# Patient Record
Sex: FEMALE | Race: WHITE | NOT HISPANIC OR LATINO | Employment: OTHER | URBAN - METROPOLITAN AREA
[De-identification: names, ages, dates, MRNs, and addresses within clinical notes are randomized per-mention and may not be internally consistent; named-entity substitution may affect disease eponyms.]

---

## 2017-04-03 ENCOUNTER — GENERIC CONVERSION - ENCOUNTER (OUTPATIENT)
Dept: OTHER | Facility: OTHER | Age: 67
End: 2017-04-03

## 2017-06-15 ENCOUNTER — GENERIC CONVERSION - ENCOUNTER (OUTPATIENT)
Dept: OTHER | Facility: OTHER | Age: 67
End: 2017-06-15

## 2017-07-31 ENCOUNTER — ALLSCRIPTS OFFICE VISIT (OUTPATIENT)
Dept: OTHER | Facility: OTHER | Age: 67
End: 2017-07-31

## 2017-07-31 DIAGNOSIS — Z00.00 ENCOUNTER FOR GENERAL ADULT MEDICAL EXAMINATION WITHOUT ABNORMAL FINDINGS: ICD-10-CM

## 2017-07-31 DIAGNOSIS — E55.9 VITAMIN D DEFICIENCY: ICD-10-CM

## 2017-07-31 DIAGNOSIS — I10 ESSENTIAL (PRIMARY) HYPERTENSION: ICD-10-CM

## 2017-08-30 ENCOUNTER — GENERIC CONVERSION - ENCOUNTER (OUTPATIENT)
Dept: OTHER | Facility: OTHER | Age: 67
End: 2017-08-30

## 2018-01-15 VITALS
SYSTOLIC BLOOD PRESSURE: 120 MMHG | HEART RATE: 121 BPM | BODY MASS INDEX: 17.61 KG/M2 | TEMPERATURE: 97.3 F | OXYGEN SATURATION: 99 % | RESPIRATION RATE: 18 BRPM | DIASTOLIC BLOOD PRESSURE: 72 MMHG | HEIGHT: 61 IN | WEIGHT: 93.25 LBS

## 2018-01-16 NOTE — MISCELLANEOUS
Message   Recorded as Task   Date: 01/13/2016 09:37 AM, Created By: System   Task Name: Schedule Appointment   Assigned To: [facility],Team   Regarding Patient: Tristan Adame, Status: Active   Comment:    System - 13 Jan 2016 9:37 AM     Preferred Communication: Mail  Ordering Site: [address] [place] [address]    Referred To: Orman Simmonds MD, [facility]  To Be Done: 13 Jan 2016   Nuno Poole - 13 Jan 2016 9:47 AM     TASK REASSIGNED: Previously Assigned To Jesica Meadows  Please call patient to schedule an appointment within 48 hours on their  The best time to reach the patient is in the  Patient prefers appointment to be  or     Last Mammogram date:[  ]    After this appointment is scheduled please reply back to [  ] team    Maria D Holm - 13 Jan 2016 4:58 PM     TASK EDITED  Called and spoke to patient  Scheduled colono consultation for 2/23/2016 in the [facility] office  Patient aware of office location  Mailed packet to patients home address  Patient states that this will be her first colono  Active Problems    1  Abdominal pain (789 00) (R10 9)   2  Acute sinusitis (461 9) (J01 90)   3  Allergic rhinitis (477 9) (J30 9)   4  Anxiety (300 00) (F41 9)   5  Arthritis (716 90) (M19 90)   6  Chronic sinusitis (473 9) (J32 9)   7  Colon cancer screening (V76 51) (Z12 11)   8  Dry skin (701 1) (L85 3)   9  Encounter for screening mammogram for malignant neoplasm of breast (V76 12)   (Z12 31)   10  Esophagitis, reflux (530 11) (K21 0)   11  Generalized anxiety disorder (300 02) (F41 1)   12  Glaucoma (365 9) (H40 9)   13  Hypertension (401 9) (I10)   14  Preoperative clearance (V72 84) (Z01 818)   15  Rhinitis, non-allergic (472 0) (J31 0)   16  Screening for neurological condition (V80 09) (Z13 89)   17  Viral gastroenteritis (008 8) (A08 4)   18  Vitamin D deficiency (268 9) (E55 9)    Current Meds   1   ALPRAZolam 0 25 MG Oral Tablet; TAKE 1 TABLET 3 TIMES DAILY AS NEEDED; Therapy: 82TIB8553 to (Evaluate:26Sbf9945); Last Rx:28Jun2013 Ordered   2  Azelastine HCl - 0 1 % Nasal Solution; INSERT 2 SQUIRTS IN EACH NOSTRIL TWICE   DAILY; Therapy: 80EHK4607 to (Last Rx:26Nov2014)  Requested for: 23DJA2521 Ordered   3  Azelastine HCl - 0 1 % Nasal Solution; Therapy: 54WUM1686 to Recorded   4  Fluticasone Propionate 50 MCG/ACT Nasal Suspension (Flonase); USE 2 SPRAYS IN   EACH NOSTRIL ONCE DAILY; Therapy: 38MNY8096 to (Evaluate:16Apr2016)  Requested for: 70Vyw0355; Last   Rx:25Anm2864 Ordered   5  Lisinopril 20 MG Oral Tablet; TAKE 1 TABLET EVERY DAY; Therapy: 76SLG5146 to (Evaluate:13Mar2016)  Requested for: 14POY3074; Last   Rx:13Jan2016 Ordered   6  Ocean Nasal Spray 0 65 % Nasal Solution; Inhale 1-2 sprays in each nostril as needed; Therapy: 85AMC2015 to (Last Rx:14Cfe0012)  Requested for: 59USG2633 Ordered   7  Omeprazole 20 MG Oral Capsule Delayed Release; TAKE 1 CAPSULE DAILY; Therapy: 53XWK8600 to (Ligia Lerma)  Requested for: 28PPN4130; Last   Rx:06Nov2012 Ordered   8  Ranitidine HCl - 300 MG Oral Tablet (Zantac); TAKE 1 TABLET DAILY AT BEDTIME; Therapy: 28VHL7443 to (Last Shawna Philip)  Requested for: 23XJU7470 Ordered   9  Vitamin D3 2000 UNIT Oral Capsule; TAKE ONE CAPSULE T BY MOUTH ONCE A DAY; Therapy: 34SNW0983 to (Last Rx:77Msy8167)  Requested for: 25Sry5781 Ordered    Allergies    1   Bactrim TABS    Signatures   Electronically signed by : Geovany Bear, ; Jan 13 2016  4:58PM EST                       (Author)

## 2018-01-18 NOTE — MISCELLANEOUS
Dear, Colleen Rob  The Physicians and staff of [address] [place] [address]  wish to remind and invite you to take full advantage of the new preventative service offered by Medicare AT NO COST TO YOU! The name of the service is the [address]    This new service is NOT to replace  any routine of scheduled visits for acute illness, chronic disease management or office check-ups  **The Annual Wellness Visit is NOT a physical**  These new free to you annual visits are designed to address any  PREVENTATIVE SERVICES that you may require and DESERVE as well as identify any other specific risk factors that may exist relating to your overall health and well-being  Additionally, you will receive a [address] [place] [address] Road to address any needed  preventative services or health services developed around your own specific needs  At the time of this visit you may receive referrals for any needed services in regards to specific problems, social/medical concerns or health risk factors  You are entitled  to the free Annual Wellness Visit every 12 months with no out of pocket cost to you for the services performed in our office  If you participate with Medicare Advantage Plan (Medicare Blue, [employer] Medicare, [employer] or other [employer]) you ARE eligible for this service    CALL US TODAY AND SCHEDULE AN Edil Emmanuel!  [phone]  Sincerely,

## 2018-04-05 DIAGNOSIS — J31.0 CHRONIC RHINITIS, UNSPECIFIED TYPE: Primary | ICD-10-CM

## 2018-04-06 RX ORDER — FLUTICASONE PROPIONATE 50 MCG
SPRAY, SUSPENSION (ML) NASAL
Qty: 48 G | Refills: 1 | Status: SHIPPED | OUTPATIENT
Start: 2018-04-06 | End: 2018-09-27 | Stop reason: SDUPTHER

## 2018-04-16 DIAGNOSIS — I10 ESSENTIAL HYPERTENSION: Primary | ICD-10-CM

## 2018-04-16 RX ORDER — LISINOPRIL 20 MG/1
TABLET ORAL
Qty: 90 TABLET | Refills: 2 | Status: SHIPPED | OUTPATIENT
Start: 2018-04-16 | End: 2018-09-27 | Stop reason: SDUPTHER

## 2018-09-27 ENCOUNTER — OFFICE VISIT (OUTPATIENT)
Dept: FAMILY MEDICINE CLINIC | Facility: CLINIC | Age: 68
End: 2018-09-27
Payer: COMMERCIAL

## 2018-09-27 VITALS
WEIGHT: 99.13 LBS | SYSTOLIC BLOOD PRESSURE: 138 MMHG | OXYGEN SATURATION: 96 % | BODY MASS INDEX: 18.71 KG/M2 | HEART RATE: 110 BPM | HEIGHT: 61 IN | DIASTOLIC BLOOD PRESSURE: 72 MMHG | RESPIRATION RATE: 18 BRPM | TEMPERATURE: 97.5 F

## 2018-09-27 DIAGNOSIS — E55.9 VITAMIN D DEFICIENCY: ICD-10-CM

## 2018-09-27 DIAGNOSIS — I10 ESSENTIAL HYPERTENSION: ICD-10-CM

## 2018-09-27 DIAGNOSIS — F41.9 ANXIETY: ICD-10-CM

## 2018-09-27 DIAGNOSIS — I10 BENIGN ESSENTIAL HYPERTENSION: Primary | ICD-10-CM

## 2018-09-27 DIAGNOSIS — Z12.11 COLON CANCER SCREENING: ICD-10-CM

## 2018-09-27 DIAGNOSIS — J31.0 RHINITIS, NON-ALLERGIC: ICD-10-CM

## 2018-09-27 DIAGNOSIS — F17.200 CURRENT EVERY DAY SMOKER: ICD-10-CM

## 2018-09-27 PROBLEM — R63.4 UNINTENTIONAL WEIGHT LOSS: Status: ACTIVE | Noted: 2017-07-31

## 2018-09-27 PROCEDURE — 3078F DIAST BP <80 MM HG: CPT | Performed by: FAMILY MEDICINE

## 2018-09-27 PROCEDURE — 1160F RVW MEDS BY RX/DR IN RCRD: CPT | Performed by: FAMILY MEDICINE

## 2018-09-27 PROCEDURE — 1101F PT FALLS ASSESS-DOCD LE1/YR: CPT | Performed by: FAMILY MEDICINE

## 2018-09-27 PROCEDURE — 99215 OFFICE O/P EST HI 40 MIN: CPT | Performed by: FAMILY MEDICINE

## 2018-09-27 PROCEDURE — 3008F BODY MASS INDEX DOCD: CPT | Performed by: FAMILY MEDICINE

## 2018-09-27 PROCEDURE — 3075F SYST BP GE 130 - 139MM HG: CPT | Performed by: FAMILY MEDICINE

## 2018-09-27 PROCEDURE — 3725F SCREEN DEPRESSION PERFORMED: CPT | Performed by: FAMILY MEDICINE

## 2018-09-27 RX ORDER — AZELASTINE 1 MG/ML
1 SPRAY, METERED NASAL 2 TIMES DAILY
Qty: 30 ML | Refills: 0 | Status: SHIPPED | OUTPATIENT
Start: 2018-09-27 | End: 2018-09-29 | Stop reason: SDUPTHER

## 2018-09-27 RX ORDER — CHOLECALCIFEROL (VITAMIN D3) 125 MCG
1 TABLET ORAL DAILY
Qty: 90 TABLET | Refills: 2 | Status: SHIPPED | OUTPATIENT
Start: 2018-09-27 | End: 2019-06-30 | Stop reason: SDUPTHER

## 2018-09-27 RX ORDER — LISINOPRIL 20 MG/1
20 TABLET ORAL DAILY
Qty: 90 TABLET | Refills: 2 | Status: SHIPPED | OUTPATIENT
Start: 2018-09-27 | End: 2019-11-26 | Stop reason: SDUPTHER

## 2018-09-27 RX ORDER — FLUTICASONE PROPIONATE 50 MCG
2 SPRAY, SUSPENSION (ML) NASAL DAILY
Qty: 48 G | Refills: 2 | Status: SHIPPED | OUTPATIENT
Start: 2018-09-27 | End: 2019-06-19 | Stop reason: SDUPTHER

## 2018-09-27 NOTE — PROGRESS NOTES
Assessment/Plan:    Current every day smoker  Spent over 50% of encounter counseling on importance of quitting smoking  Counseled on strategies to handle anxiety to help her quit smoking  Suggested she reach out to quit hotline to help her quit  Referred for PFT    Vitamin D deficiency  Vitamin D level ordered     Benign essential hypertension  Controlled  Continue Lisinopril 20 mg  Kidney function BW and microalb ordered          Problem List Items Addressed This Visit        Respiratory    Rhinitis, non-allergic    Relevant Medications    fluticasone (FLONASE) 50 mcg/act nasal spray    azelastine (ASTELIN) 0 1 % nasal spray    Other Relevant Orders    CBC and differential    Comprehensive metabolic panel    Lipid panel    TSH, 3rd generation with Free T4 reflex    Microalbumin / creatinine urine ratio       Cardiovascular and Mediastinum    Benign essential hypertension - Primary     Controlled  Continue Lisinopril 20 mg  Kidney function BW and microalb ordered          Relevant Medications    lisinopril (ZESTRIL) 20 mg tablet    Other Relevant Orders    CBC and differential    Comprehensive metabolic panel    Lipid panel    TSH, 3rd generation with Free T4 reflex    Microalbumin / creatinine urine ratio    Hypertension    Relevant Medications    lisinopril (ZESTRIL) 20 mg tablet    Other Relevant Orders    CBC and differential    Comprehensive metabolic panel    Lipid panel    TSH, 3rd generation with Free T4 reflex    Microalbumin / creatinine urine ratio       Other    Anxiety    Relevant Orders    CBC and differential    Comprehensive metabolic panel    Lipid panel    TSH, 3rd generation with Free T4 reflex    Microalbumin / creatinine urine ratio    Vitamin D deficiency     Vitamin D level ordered          Relevant Medications    Ergocalciferol (VITAMIN D2) 2000 units TABS    Other Relevant Orders    CBC and differential    Comprehensive metabolic panel    Lipid panel    TSH, 3rd generation with Free T4 reflex    Microalbumin / creatinine urine ratio    Current every day smoker     Spent over 50% of encounter counseling on importance of quitting smoking  Counseled on strategies to handle anxiety to help her quit smoking  Suggested she reach out to quit hotline to help her quit  Referred for PFT         Relevant Orders    Spirometry    CBC and differential    Comprehensive metabolic panel    Lipid panel    TSH, 3rd generation with Free T4 reflex    Microalbumin / creatinine urine ratio      Other Visit Diagnoses     Colon cancer screening        Relevant Orders    Ambulatory referral to Gastroenterology    CBC and differential    Comprehensive metabolic panel    Lipid panel    TSH, 3rd generation with Free T4 reflex    Microalbumin / creatinine urine ratio            Subjective:      Patient ID: Krishna Motta is a 70 y o  female  71 yo female with HTN and allergic rhinitis here today for follow up         The following portions of the patient's history were reviewed and updated as appropriate:   She  has no past medical history on file  She   Patient Active Problem List    Diagnosis Date Noted    Current every day smoker 09/27/2018    Unintentional weight loss 07/31/2017    Glaucoma 07/30/2015    Dry skin 01/30/2015    Rhinitis, non-allergic 05/19/2014    Vitamin D deficiency 05/19/2014    Anxiety 12/19/2013    Generalized anxiety disorder 06/28/2013    Chronic sinusitis 06/26/2013    Hypertension 09/11/2012    Benign essential hypertension 02/21/2008     She  has no past surgical history on file  Her family history includes Hypertension in her sister  She  reports that she has been smoking Cigarettes  She has never used smokeless tobacco  She reports that she does not drink alcohol or use drugs    Current Outpatient Prescriptions   Medication Sig Dispense Refill    azelastine (ASTELIN) 0 1 % nasal spray 1 spray into each nostril 2 (two) times a day Use in each nostril as directed 30 mL 0    Ergocalciferol (VITAMIN D2) 2000 units TABS Take 1 tablet (2,000 Units total) by mouth daily 90 tablet 2    fluticasone (FLONASE) 50 mcg/act nasal spray 2 sprays into each nostril daily 48 g 2    lisinopril (ZESTRIL) 20 mg tablet Take 1 tablet (20 mg total) by mouth daily 90 tablet 2     No current facility-administered medications for this visit  Current Outpatient Prescriptions on File Prior to Visit   Medication Sig    [DISCONTINUED] fluticasone (FLONASE) 50 mcg/act nasal spray instill 2 sprays into each nostril once daily    [DISCONTINUED] lisinopril (ZESTRIL) 20 mg tablet take 1 tablet by mouth once daily     No current facility-administered medications on file prior to visit       Review of Systems   All other systems reviewed and are negative  Objective:      /72 (BP Location: Right arm, Patient Position: Sitting, Cuff Size: Standard)   Pulse (!) 110   Temp 97 5 °F (36 4 °C) (Tympanic)   Resp 18   Ht 5' 1" (1 549 m)   Wt 45 kg (99 lb 2 oz)   SpO2 96%   BMI 18 73 kg/m²          Physical Exam   Constitutional: She is oriented to person, place, and time  She appears well-developed  HENT:   Head: Normocephalic  Right Ear: External ear normal    Left Ear: External ear normal    Nose: Nose normal    Mouth/Throat: Oropharynx is clear and moist    Eyes: Pupils are equal, round, and reactive to light  Conjunctivae and EOM are normal    Neck: Normal range of motion  Neck supple  No thyromegaly present  Cardiovascular: Normal rate, regular rhythm and normal heart sounds  Pulmonary/Chest: Effort normal and breath sounds normal    Abdominal: Soft  There is no tenderness  There is no rebound and no guarding  Musculoskeletal: Normal range of motion  Neurological: She is alert and oriented to person, place, and time  She has normal reflexes  Skin: Skin is dry  Psychiatric: She has a normal mood and affect  Nursing note and vitals reviewed

## 2018-09-27 NOTE — ASSESSMENT & PLAN NOTE
Spent over 50% of encounter counseling on importance of quitting smoking  Counseled on strategies to handle anxiety to help her quit smoking  Suggested she reach out to quit hotline to help her quit  Referred for PFT

## 2018-09-29 DIAGNOSIS — J31.0 RHINITIS, NON-ALLERGIC: ICD-10-CM

## 2018-10-01 RX ORDER — AZELASTINE HYDROCHLORIDE 137 UG/1
SPRAY, METERED NASAL
Qty: 30 ML | Refills: 0 | Status: SHIPPED | OUTPATIENT
Start: 2018-10-01 | End: 2019-11-26 | Stop reason: SDUPTHER

## 2018-10-11 ENCOUNTER — TELEPHONE (OUTPATIENT)
Dept: GASTROENTEROLOGY | Facility: MEDICAL CENTER | Age: 68
End: 2018-10-11

## 2018-10-15 ENCOUNTER — HOSPITAL ENCOUNTER (OUTPATIENT)
Dept: PULMONOLOGY | Facility: HOSPITAL | Age: 68
Discharge: HOME | End: 2018-10-15

## 2018-10-15 RX ORDER — ALBUTEROL SULFATE 2.5 MG/3ML
2.5 SOLUTION RESPIRATORY (INHALATION) ONCE AS NEEDED
Status: DISCONTINUED | OUTPATIENT
Start: 2018-10-15 | End: 2018-10-19 | Stop reason: HOSPADM

## 2018-11-07 RX ORDER — ALBUTEROL SULFATE 2.5 MG/3ML
2.5 SOLUTION RESPIRATORY (INHALATION) ONCE AS NEEDED
Status: CANCELLED | OUTPATIENT
Start: 2018-11-07

## 2018-11-08 ENCOUNTER — HOSPITAL ENCOUNTER (OUTPATIENT)
Dept: PULMONOLOGY | Facility: HOSPITAL | Age: 68
Discharge: HOME | End: 2018-11-08

## 2019-04-25 ENCOUNTER — LAB (OUTPATIENT)
Dept: LAB | Facility: HOSPITAL | Age: 69
End: 2019-04-25
Payer: COMMERCIAL

## 2019-04-25 LAB
ALBUMIN SERPL BCP-MCNC: 3.7 G/DL (ref 3.5–5)
ALP SERPL-CCNC: 74 U/L (ref 46–116)
ALT SERPL W P-5'-P-CCNC: 20 U/L (ref 12–78)
ANION GAP SERPL CALCULATED.3IONS-SCNC: 7 MMOL/L (ref 4–13)
AST SERPL W P-5'-P-CCNC: 17 U/L (ref 5–45)
BASOPHILS # BLD AUTO: 0.13 THOUSANDS/ΜL (ref 0–0.1)
BASOPHILS NFR BLD AUTO: 2 % (ref 0–1)
BILIRUB SERPL-MCNC: 0.32 MG/DL (ref 0.2–1)
BUN SERPL-MCNC: 14 MG/DL (ref 5–25)
CALCIUM SERPL-MCNC: 9.4 MG/DL (ref 8.3–10.1)
CHLORIDE SERPL-SCNC: 103 MMOL/L (ref 100–108)
CHOLEST SERPL-MCNC: 236 MG/DL (ref 50–200)
CO2 SERPL-SCNC: 26 MMOL/L (ref 21–32)
CREAT SERPL-MCNC: 1.06 MG/DL (ref 0.6–1.3)
CREAT UR-MCNC: 33.1 MG/DL
EOSINOPHIL # BLD AUTO: 0.38 THOUSAND/ΜL (ref 0–0.61)
EOSINOPHIL NFR BLD AUTO: 5 % (ref 0–6)
ERYTHROCYTE [DISTWIDTH] IN BLOOD BY AUTOMATED COUNT: 13.3 % (ref 11.6–15.1)
GFR SERPL CREATININE-BSD FRML MDRD: 54 ML/MIN/1.73SQ M
GLUCOSE P FAST SERPL-MCNC: 86 MG/DL (ref 65–99)
HCT VFR BLD AUTO: 39.3 % (ref 34.8–46.1)
HDLC SERPL-MCNC: 92 MG/DL (ref 40–60)
HGB BLD-MCNC: 12.7 G/DL (ref 11.5–15.4)
IMM GRANULOCYTES # BLD AUTO: 0.02 THOUSAND/UL (ref 0–0.2)
IMM GRANULOCYTES NFR BLD AUTO: 0 % (ref 0–2)
LDLC SERPL CALC-MCNC: 130 MG/DL (ref 0–100)
LYMPHOCYTES # BLD AUTO: 1.62 THOUSANDS/ΜL (ref 0.6–4.47)
LYMPHOCYTES NFR BLD AUTO: 22 % (ref 14–44)
MCH RBC QN AUTO: 31.1 PG (ref 26.8–34.3)
MCHC RBC AUTO-ENTMCNC: 32.3 G/DL (ref 31.4–37.4)
MCV RBC AUTO: 96 FL (ref 82–98)
MICROALBUMIN UR-MCNC: <5 MG/L (ref 0–20)
MICROALBUMIN/CREAT 24H UR: <15 MG/G CREATININE (ref 0–30)
MONOCYTES # BLD AUTO: 0.82 THOUSAND/ΜL (ref 0.17–1.22)
MONOCYTES NFR BLD AUTO: 11 % (ref 4–12)
NEUTROPHILS # BLD AUTO: 4.27 THOUSANDS/ΜL (ref 1.85–7.62)
NEUTS SEG NFR BLD AUTO: 60 % (ref 43–75)
NONHDLC SERPL-MCNC: 144 MG/DL
NRBC BLD AUTO-RTO: 0 /100 WBCS
PLATELET # BLD AUTO: 266 THOUSANDS/UL (ref 149–390)
PMV BLD AUTO: 10.7 FL (ref 8.9–12.7)
POTASSIUM SERPL-SCNC: 4.6 MMOL/L (ref 3.5–5.3)
PROT SERPL-MCNC: 7.1 G/DL (ref 6.4–8.2)
RBC # BLD AUTO: 4.09 MILLION/UL (ref 3.81–5.12)
SODIUM SERPL-SCNC: 136 MMOL/L (ref 136–145)
TRIGL SERPL-MCNC: 71 MG/DL
TSH SERPL DL<=0.05 MIU/L-ACNC: 1.1 UIU/ML (ref 0.36–3.74)
WBC # BLD AUTO: 7.24 THOUSAND/UL (ref 4.31–10.16)

## 2019-04-25 PROCEDURE — 82570 ASSAY OF URINE CREATININE: CPT | Performed by: FAMILY MEDICINE

## 2019-04-25 PROCEDURE — 80061 LIPID PANEL: CPT | Performed by: FAMILY MEDICINE

## 2019-04-25 PROCEDURE — 82043 UR ALBUMIN QUANTITATIVE: CPT | Performed by: FAMILY MEDICINE

## 2019-04-25 PROCEDURE — 84443 ASSAY THYROID STIM HORMONE: CPT | Performed by: FAMILY MEDICINE

## 2019-04-25 PROCEDURE — 36415 COLL VENOUS BLD VENIPUNCTURE: CPT | Performed by: FAMILY MEDICINE

## 2019-04-25 PROCEDURE — 85025 COMPLETE CBC W/AUTO DIFF WBC: CPT | Performed by: FAMILY MEDICINE

## 2019-04-25 PROCEDURE — 80053 COMPREHEN METABOLIC PANEL: CPT | Performed by: FAMILY MEDICINE

## 2019-05-22 ENCOUNTER — TELEPHONE (OUTPATIENT)
Dept: FAMILY MEDICINE CLINIC | Facility: CLINIC | Age: 69
End: 2019-05-22

## 2019-06-19 DIAGNOSIS — J31.0 RHINITIS, NON-ALLERGIC: ICD-10-CM

## 2019-06-24 RX ORDER — FLUTICASONE PROPIONATE 50 MCG
SPRAY, SUSPENSION (ML) NASAL
Qty: 48 G | Refills: 2 | Status: SHIPPED | OUTPATIENT
Start: 2019-06-24 | End: 2020-01-08

## 2019-06-30 DIAGNOSIS — E55.9 VITAMIN D DEFICIENCY: ICD-10-CM

## 2019-07-01 RX ORDER — GLUCOSAMINE/CHONDR SU A SOD 750-600 MG
TABLET ORAL
Qty: 90 CAPSULE | Refills: 2 | Status: SHIPPED | OUTPATIENT
Start: 2019-07-01 | End: 2019-11-26 | Stop reason: SDUPTHER

## 2019-11-26 ENCOUNTER — OFFICE VISIT (OUTPATIENT)
Dept: FAMILY MEDICINE CLINIC | Facility: CLINIC | Age: 69
End: 2019-11-26

## 2019-11-26 VITALS
OXYGEN SATURATION: 98 % | HEART RATE: 113 BPM | TEMPERATURE: 97.6 F | WEIGHT: 98 LBS | RESPIRATION RATE: 16 BRPM | SYSTOLIC BLOOD PRESSURE: 144 MMHG | BODY MASS INDEX: 18.52 KG/M2 | DIASTOLIC BLOOD PRESSURE: 72 MMHG

## 2019-11-26 DIAGNOSIS — I10 BENIGN ESSENTIAL HYPERTENSION: ICD-10-CM

## 2019-11-26 DIAGNOSIS — Z23 NEED FOR VACCINATION: Primary | ICD-10-CM

## 2019-11-26 DIAGNOSIS — I10 ESSENTIAL HYPERTENSION: ICD-10-CM

## 2019-11-26 DIAGNOSIS — E55.9 VITAMIN D DEFICIENCY: ICD-10-CM

## 2019-11-26 DIAGNOSIS — J31.0 RHINITIS, NON-ALLERGIC: ICD-10-CM

## 2019-11-26 DIAGNOSIS — F17.200 CURRENT EVERY DAY SMOKER: ICD-10-CM

## 2019-11-26 PROCEDURE — 1160F RVW MEDS BY RX/DR IN RCRD: CPT | Performed by: FAMILY MEDICINE

## 2019-11-26 PROCEDURE — 99213 OFFICE O/P EST LOW 20 MIN: CPT | Performed by: FAMILY MEDICINE

## 2019-11-26 PROCEDURE — 4004F PT TOBACCO SCREEN RCVD TLK: CPT | Performed by: FAMILY MEDICINE

## 2019-11-26 PROCEDURE — 1101F PT FALLS ASSESS-DOCD LE1/YR: CPT | Performed by: FAMILY MEDICINE

## 2019-11-26 PROCEDURE — 3725F SCREEN DEPRESSION PERFORMED: CPT | Performed by: FAMILY MEDICINE

## 2019-11-26 RX ORDER — AZELASTINE HYDROCHLORIDE 137 UG/1
1 SPRAY, METERED NASAL 2 TIMES DAILY
Qty: 30 ML | Refills: 2 | Status: SHIPPED | OUTPATIENT
Start: 2019-11-26 | End: 2020-01-08 | Stop reason: SDUPTHER

## 2019-11-26 RX ORDER — LISINOPRIL 20 MG/1
20 TABLET ORAL DAILY
Qty: 90 TABLET | Refills: 2 | Status: SHIPPED | OUTPATIENT
Start: 2019-11-26 | End: 2020-08-10

## 2019-11-26 NOTE — ASSESSMENT & PLAN NOTE
BP at home in the 110-120 over 80 range  Continue Lisinopril 20 mg  Check BW including kidney function

## 2020-01-08 ENCOUNTER — TELEPHONE (OUTPATIENT)
Dept: FAMILY MEDICINE CLINIC | Facility: CLINIC | Age: 70
End: 2020-01-08

## 2020-01-08 DIAGNOSIS — J31.0 RHINITIS, NON-ALLERGIC: ICD-10-CM

## 2020-01-08 DIAGNOSIS — E55.9 VITAMIN D DEFICIENCY: ICD-10-CM

## 2020-01-08 RX ORDER — FLUTICASONE PROPIONATE 50 MCG
SPRAY, SUSPENSION (ML) NASAL
Qty: 48 G | Refills: 0 | Status: SHIPPED | OUTPATIENT
Start: 2020-01-08 | End: 2020-06-25 | Stop reason: SDUPTHER

## 2020-01-08 RX ORDER — GLUCOSAMINE/CHONDR SU A SOD 750-600 MG
TABLET ORAL
Qty: 90 CAPSULE | Refills: 0 | Status: SHIPPED | OUTPATIENT
Start: 2020-01-08 | End: 2020-01-08 | Stop reason: SDUPTHER

## 2020-01-08 RX ORDER — AZELASTINE HYDROCHLORIDE 137 UG/1
1 SPRAY, METERED NASAL 2 TIMES DAILY
Qty: 30 ML | Refills: 2 | Status: SHIPPED | OUTPATIENT
Start: 2020-01-08

## 2020-01-08 NOTE — TELEPHONE ENCOUNTER
Patient called stating she needs refills on her medications  She went to the pharmacy to get her refills and they stated they don't have anything in the system from 11/26/19      Please resend the refills for her medications  lisinopril (ZESTRIL) 20 mg tablet  fluticasone (FLONASE) 50 mcg/act nasal spray  Cholecalciferol (RA VITAMIN D-3) 50 MCG (2000 UT) CAPS  Azelastine HCl 137 MCG/SPRAY SOLN

## 2020-04-07 DIAGNOSIS — E55.9 VITAMIN D DEFICIENCY: ICD-10-CM

## 2020-06-25 DIAGNOSIS — J31.0 RHINITIS, NON-ALLERGIC: ICD-10-CM

## 2020-06-26 RX ORDER — FLUTICASONE PROPIONATE 50 MCG
2 SPRAY, SUSPENSION (ML) NASAL DAILY
Qty: 48 G | Refills: 1 | Status: SHIPPED | OUTPATIENT
Start: 2020-06-26 | End: 2020-12-14

## 2020-08-08 DIAGNOSIS — I10 ESSENTIAL HYPERTENSION: ICD-10-CM

## 2020-08-10 RX ORDER — LISINOPRIL 20 MG/1
TABLET ORAL
Qty: 90 TABLET | Refills: 2 | Status: SHIPPED | OUTPATIENT
Start: 2020-08-10 | End: 2021-02-18 | Stop reason: SDUPTHER

## 2020-12-13 DIAGNOSIS — J31.0 RHINITIS, NON-ALLERGIC: ICD-10-CM

## 2020-12-14 RX ORDER — FLUTICASONE PROPIONATE 50 MCG
SPRAY, SUSPENSION (ML) NASAL
Qty: 48 G | Refills: 1 | Status: SHIPPED | OUTPATIENT
Start: 2020-12-14 | End: 2021-02-18 | Stop reason: SDUPTHER

## 2021-02-18 ENCOUNTER — TELEMEDICINE (OUTPATIENT)
Dept: FAMILY MEDICINE CLINIC | Facility: CLINIC | Age: 71
End: 2021-02-18

## 2021-02-18 DIAGNOSIS — J31.0 RHINITIS, NON-ALLERGIC: ICD-10-CM

## 2021-02-18 DIAGNOSIS — E55.9 VITAMIN D DEFICIENCY: ICD-10-CM

## 2021-02-18 DIAGNOSIS — I10 ESSENTIAL HYPERTENSION: Primary | ICD-10-CM

## 2021-02-18 PROCEDURE — G2025 DIS SITE TELE SVCS RHC/FQHC: HCPCS | Performed by: FAMILY MEDICINE

## 2021-02-18 RX ORDER — GLUCOSAMINE/CHONDR SU A SOD 750-600 MG
1 TABLET ORAL DAILY
Qty: 90 CAPSULE | Refills: 2 | Status: SHIPPED | OUTPATIENT
Start: 2021-02-18 | End: 2021-09-14 | Stop reason: SDUPTHER

## 2021-02-18 RX ORDER — LISINOPRIL 20 MG/1
20 TABLET ORAL DAILY
Qty: 90 TABLET | Refills: 2 | Status: SHIPPED | OUTPATIENT
Start: 2021-02-18 | End: 2021-09-14 | Stop reason: SDUPTHER

## 2021-02-18 RX ORDER — FLUTICASONE PROPIONATE 50 MCG
2 SPRAY, SUSPENSION (ML) NASAL DAILY
Qty: 48 G | Refills: 1 | Status: SHIPPED | OUTPATIENT
Start: 2021-02-18 | End: 2021-09-14 | Stop reason: SDUPTHER

## 2021-02-18 NOTE — PROGRESS NOTES
Virtual Brief Visit    Assessment/Plan:    Problem List Items Addressed This Visit        Respiratory    Rhinitis, non-allergic    Relevant Medications    fluticasone (FLONASE) 50 mcg/act nasal spray       Cardiovascular and Mediastinum    Hypertension - Primary    Relevant Medications    lisinopril (ZESTRIL) 20 mg tablet    Other Relevant Orders    CBC and differential    Comprehensive metabolic panel    Lipid panel    Microalbumin / creatinine urine ratio    TSH, 3rd generation with Free T4 reflex       Other    Vitamin D deficiency    Relevant Medications    Cholecalciferol (RA Vitamin D-3) 50 MCG (2000 UT) CAPS    Other Relevant Orders    Vitamin D 25 hydroxy                Reason for visit is No chief complaint on file  Encounter provider Joye Apley, MD    Provider located at [address] [address] [phone]    Recent Visits  No visits were found meeting these conditions  Showing recent visits within past 7 days and meeting all other requirements     Today's Visits  Date Type Provider Dept   02/18/21 Telemedicine Joye Apley, MD  [facility]   Showing today's visits and meeting all other requirements     Future Appointments  No visits were found meeting these conditions  Showing future appointments within next 150 days and meeting all other requirements        After connecting through telephone, the patient was identified by name and date of birth  Mayra Williamson was informed that this is a telemedicine visit and that the visit is being conducted through telephone  My office door was closed  No one else was in the room  She acknowledged consent and understanding of privacy and security of the platform  The patient has agreed to participate and understands she can discontinue the visit at any time  Patient is aware this is a billable service  Subjective    Mayra Williamson is a 69 y o  female     70 yo female with well controlled HTN on Lisinopril 20 mg, chronic allergic rhinits, states she is doing well  No concerns today  History reviewed  No pertinent past medical history  History reviewed  No pertinent surgical history  Current Outpatient Medications   Medication Sig Dispense Refill    Azelastine HCl 137 MCG/SPRAY SOLN 1 spray by Each Nare route 2 (two) times a day 30 mL 2    Cholecalciferol (RA Vitamin D-3) 50 MCG (2000 UT) CAPS Take 1 capsule (2,000 Units total) by mouth daily 90 capsule 2    fluticasone (FLONASE) 50 mcg/act nasal spray 2 sprays into each nostril daily 48 g 1    lisinopril (ZESTRIL) 20 mg tablet Take 1 tablet (20 mg total) by mouth daily 90 tablet 2     No current facility-administered medications for this visit  No Known Allergies    Review of Systems   All other systems reviewed and are negative  There were no vitals filed for this visit  I spent 15 minutes directly with the patient during this visit    VIRTUAL VISIT [employer] acknowledges that she has consented to an online visit or consultation  She understands that the online visit is based solely on information provided by her, and that, in the absence of a face-to-face physical evaluation by the physician, the diagnosis she receives is both limited and provisional in terms of accuracy and completeness  This is not intended to replace a full medical face-to-face evaluation by the physician  Marcellus Watson understands and accepts these terms  It was my intent to perform this visit via video technology but the patient was not able to do a video connection so the visit was completed via audio telephone only

## 2021-05-18 ENCOUNTER — VBI (OUTPATIENT)
Dept: ADMINISTRATIVE | Facility: OTHER | Age: 71
End: 2021-05-18

## 2021-06-09 ENCOUNTER — VBI (OUTPATIENT)
Dept: ADMINISTRATIVE | Facility: OTHER | Age: 71
End: 2021-06-09

## 2021-07-23 ENCOUNTER — VBI (OUTPATIENT)
Dept: ADMINISTRATIVE | Facility: OTHER | Age: 71
End: 2021-07-23

## 2021-09-14 ENCOUNTER — OFFICE VISIT (OUTPATIENT)
Dept: FAMILY MEDICINE CLINIC | Facility: CLINIC | Age: 71
End: 2021-09-14

## 2021-09-14 VITALS
SYSTOLIC BLOOD PRESSURE: 138 MMHG | RESPIRATION RATE: 16 BRPM | HEIGHT: 62 IN | TEMPERATURE: 97.8 F | OXYGEN SATURATION: 98 % | BODY MASS INDEX: 18.75 KG/M2 | WEIGHT: 101.9 LBS | HEART RATE: 118 BPM | DIASTOLIC BLOOD PRESSURE: 70 MMHG

## 2021-09-14 DIAGNOSIS — E55.9 VITAMIN D DEFICIENCY: ICD-10-CM

## 2021-09-14 DIAGNOSIS — Z11.59 ENCOUNTER FOR HEPATITIS C SCREENING TEST FOR LOW RISK PATIENT: ICD-10-CM

## 2021-09-14 DIAGNOSIS — I10 BENIGN ESSENTIAL HYPERTENSION: ICD-10-CM

## 2021-09-14 DIAGNOSIS — J31.0 RHINITIS, NON-ALLERGIC: ICD-10-CM

## 2021-09-14 DIAGNOSIS — F17.200 CURRENT EVERY DAY SMOKER: Primary | ICD-10-CM

## 2021-09-14 DIAGNOSIS — I10 ESSENTIAL HYPERTENSION: ICD-10-CM

## 2021-09-14 DIAGNOSIS — Z74.8 ASSISTANCE NEEDED WITH TRANSPORTATION: ICD-10-CM

## 2021-09-14 PROCEDURE — 3078F DIAST BP <80 MM HG: CPT | Performed by: FAMILY MEDICINE

## 2021-09-14 PROCEDURE — 3288F FALL RISK ASSESSMENT DOCD: CPT | Performed by: FAMILY MEDICINE

## 2021-09-14 PROCEDURE — 99213 OFFICE O/P EST LOW 20 MIN: CPT | Performed by: FAMILY MEDICINE

## 2021-09-14 PROCEDURE — 3725F SCREEN DEPRESSION PERFORMED: CPT | Performed by: FAMILY MEDICINE

## 2021-09-14 PROCEDURE — 4004F PT TOBACCO SCREEN RCVD TLK: CPT | Performed by: FAMILY MEDICINE

## 2021-09-14 PROCEDURE — 1101F PT FALLS ASSESS-DOCD LE1/YR: CPT | Performed by: FAMILY MEDICINE

## 2021-09-14 PROCEDURE — 3008F BODY MASS INDEX DOCD: CPT | Performed by: FAMILY MEDICINE

## 2021-09-14 PROCEDURE — 3075F SYST BP GE 130 - 139MM HG: CPT | Performed by: FAMILY MEDICINE

## 2021-09-14 RX ORDER — LISINOPRIL 20 MG/1
20 TABLET ORAL DAILY
Qty: 90 TABLET | Refills: 2 | Status: SHIPPED | OUTPATIENT
Start: 2021-09-14

## 2021-09-14 RX ORDER — FLUTICASONE PROPIONATE 50 MCG
2 SPRAY, SUSPENSION (ML) NASAL DAILY
Qty: 48 G | Refills: 1 | Status: SHIPPED | OUTPATIENT
Start: 2021-09-14 | End: 2022-04-11

## 2021-09-14 RX ORDER — GLUCOSAMINE/CHONDR SU A SOD 750-600 MG
1 TABLET ORAL DAILY
Qty: 90 CAPSULE | Refills: 2 | Status: SHIPPED | OUTPATIENT
Start: 2021-09-14 | End: 2021-10-15 | Stop reason: SDUPTHER

## 2021-09-14 NOTE — PROGRESS NOTES
Assessment/Plan:    Current every day smoker  Tobacco Cessation Counseling: Tobacco cessation counseling and education was provided  The patient is sincerely urged to quit consumption of tobacco  She is not ready to quit tobacco  The numerous health risks of tobacco consumption were discussed  will reach out when she feels ready to quit   Diagnoses and all orders for this visit:    Current every day smoker    Vitamin D deficiency  -     Cholecalciferol (RA Vitamin D-3) 50 MCG (2000 UT) CAPS; Take 1 capsule (2,000 Units total) by mouth daily  -     Vitamin D 25 hydroxy; Future    Benign essential hypertension    Essential hypertension  -     lisinopril (ZESTRIL) 20 mg tablet; Take 1 tablet (20 mg total) by mouth daily  -     CBC and differential; Future  -     Comprehensive metabolic panel; Future  -     Lipid panel; Future  -     Microalbumin / creatinine urine ratio; Future  -     TSH, 3rd generation with Free T4 reflex; Future    Encounter for hepatitis C screening test for low risk patient  -     Hepatitis C antibody; Future    Rhinitis, non-allergic  -     fluticasone (FLONASE) 50 mcg/act nasal spray; 2 sprays into each nostril daily    Assistance needed with transportation  -     Ambulatory referral to social work care management program; Future          Subjective:      Patient ID: Gabriele Pena is a 66 y o  female  67 yo female with known HTN, hyperlipidemia, chronic sinusitis, complains of 1 week history of cough productive of clear sputum and nasal congestion  Denies fever, chills, SOB, abdominal pain, nausea, vomiting, change in BM, change in sense of smell  Continues to smoke       The following portions of the patient's history were reviewed and updated as appropriate: She  has no past medical history on file    She   Patient Active Problem List    Diagnosis Date Noted    Current every day smoker 09/27/2018    Unintentional weight loss 07/31/2017    Glaucoma 07/30/2015    Dry skin 01/30/2015    Rhinitis, non-allergic 05/19/2014    Vitamin D deficiency 05/19/2014    Anxiety 12/19/2013    Generalized anxiety disorder 06/28/2013    Chronic sinusitis 06/26/2013    Hypertension 09/11/2012    Benign essential hypertension 02/21/2008     She  has no past surgical history on file  Her family history includes Hypertension in her sister  She  reports that she has been smoking cigarettes  She has never used smokeless tobacco  She reports that she does not drink alcohol and does not use drugs  Current Outpatient Medications   Medication Sig Dispense Refill    Cholecalciferol (RA Vitamin D-3) 50 MCG (2000 UT) CAPS Take 1 capsule (2,000 Units total) by mouth daily 90 capsule 2    fluticasone (FLONASE) 50 mcg/act nasal spray 2 sprays into each nostril daily 48 g 1    lisinopril (ZESTRIL) 20 mg tablet Take 1 tablet (20 mg total) by mouth daily 90 tablet 2    Azelastine HCl 137 MCG/SPRAY SOLN 1 spray by Each Nare route 2 (two) times a day 30 mL 2     No current facility-administered medications for this visit  Current Outpatient Medications on File Prior to Visit   Medication Sig    [DISCONTINUED] Cholecalciferol (RA Vitamin D-3) 50 MCG (2000 UT) CAPS Take 1 capsule (2,000 Units total) by mouth daily    [DISCONTINUED] fluticasone (FLONASE) 50 mcg/act nasal spray 2 sprays into each nostril daily    [DISCONTINUED] lisinopril (ZESTRIL) 20 mg tablet Take 1 tablet (20 mg total) by mouth daily    Azelastine HCl 137 MCG/SPRAY SOLN 1 spray by Each Nare route 2 (two) times a day     No current facility-administered medications on file prior to visit       Review of Systems   HENT: Positive for congestion, postnasal drip and sneezing  All other systems reviewed and are negative          Objective:      /70 (BP Location: Left arm, Patient Position: Sitting, Cuff Size: Child)   Pulse (!) 118   Temp 97 8 °F (36 6 °C) (Temporal)   Resp 16   Ht 5' 2" (1 575 m)   Wt 46 2 kg (101 lb 14 4 oz)   SpO2 98%   BMI 18 64 kg/m²          Physical Exam  Vitals and nursing note reviewed  Constitutional:       Appearance: She is well-developed  HENT:      Head: Normocephalic  Right Ear: External ear normal       Left Ear: External ear normal       Nose: Nose normal    Eyes:      Conjunctiva/sclera: Conjunctivae normal       Pupils: Pupils are equal, round, and reactive to light  Neck:      Thyroid: No thyromegaly  Cardiovascular:      Rate and Rhythm: Normal rate and regular rhythm  Heart sounds: Normal heart sounds  Pulmonary:      Effort: Pulmonary effort is normal       Breath sounds: Rhonchi present  Abdominal:      Palpations: Abdomen is soft  Tenderness: There is no abdominal tenderness  There is no guarding or rebound  Musculoskeletal:         General: Normal range of motion  Cervical back: Normal range of motion and neck supple  Skin:     General: Skin is dry  Neurological:      Mental Status: She is alert and oriented to person, place, and time  Deep Tendon Reflexes: Reflexes are normal and symmetric

## 2021-09-14 NOTE — ASSESSMENT & PLAN NOTE
Tobacco Cessation Counseling: Tobacco cessation counseling and education was provided  The patient is sincerely urged to quit consumption of tobacco  She is not ready to quit tobacco  The numerous health risks of tobacco consumption were discussed  will reach out when she feels ready to quit

## 2021-10-01 ENCOUNTER — PATIENT OUTREACH (OUTPATIENT)
Dept: FAMILY MEDICINE CLINIC | Facility: CLINIC | Age: 71
End: 2021-10-01

## 2021-10-01 DIAGNOSIS — Z78.9 UNDER CARE OF SOCIAL WORKER: Primary | ICD-10-CM

## 2021-10-04 ENCOUNTER — PATIENT OUTREACH (OUTPATIENT)
Dept: FAMILY MEDICINE CLINIC | Facility: CLINIC | Age: 71
End: 2021-10-04

## 2021-10-06 ENCOUNTER — PATIENT OUTREACH (OUTPATIENT)
Dept: FAMILY MEDICINE CLINIC | Facility: CLINIC | Age: 71
End: 2021-10-06

## 2021-10-14 ENCOUNTER — APPOINTMENT (OUTPATIENT)
Dept: LAB | Age: 71
End: 2021-10-14
Payer: COMMERCIAL

## 2021-10-14 DIAGNOSIS — E55.9 VITAMIN D DEFICIENCY: ICD-10-CM

## 2021-10-14 DIAGNOSIS — Z11.59 ENCOUNTER FOR HEPATITIS C SCREENING TEST FOR LOW RISK PATIENT: ICD-10-CM

## 2021-10-14 DIAGNOSIS — I10 ESSENTIAL HYPERTENSION: ICD-10-CM

## 2021-10-14 LAB
25(OH)D3 SERPL-MCNC: 22.2 NG/ML (ref 30–100)
ALBUMIN SERPL BCP-MCNC: 3.4 G/DL (ref 3.5–5)
ALP SERPL-CCNC: 104 U/L (ref 46–116)
ALT SERPL W P-5'-P-CCNC: 24 U/L (ref 12–78)
ANION GAP SERPL CALCULATED.3IONS-SCNC: 3 MMOL/L (ref 4–13)
AST SERPL W P-5'-P-CCNC: 16 U/L (ref 5–45)
BASOPHILS # BLD AUTO: 0.12 THOUSANDS/ΜL (ref 0–0.1)
BASOPHILS NFR BLD AUTO: 2 % (ref 0–1)
BILIRUB SERPL-MCNC: 0.37 MG/DL (ref 0.2–1)
BUN SERPL-MCNC: 16 MG/DL (ref 5–25)
CALCIUM ALBUM COR SERPL-MCNC: 9.4 MG/DL (ref 8.3–10.1)
CALCIUM SERPL-MCNC: 8.9 MG/DL (ref 8.3–10.1)
CHLORIDE SERPL-SCNC: 111 MMOL/L (ref 100–108)
CHOLEST SERPL-MCNC: 162 MG/DL (ref 50–200)
CO2 SERPL-SCNC: 23 MMOL/L (ref 21–32)
CREAT SERPL-MCNC: 1.08 MG/DL (ref 0.6–1.3)
CREAT UR-MCNC: 161 MG/DL
EOSINOPHIL # BLD AUTO: 0.45 THOUSAND/ΜL (ref 0–0.61)
EOSINOPHIL NFR BLD AUTO: 6 % (ref 0–6)
ERYTHROCYTE [DISTWIDTH] IN BLOOD BY AUTOMATED COUNT: 14.5 % (ref 11.6–15.1)
GFR SERPL CREATININE-BSD FRML MDRD: 51 ML/MIN/1.73SQ M
GLUCOSE P FAST SERPL-MCNC: 106 MG/DL (ref 65–99)
HCT VFR BLD AUTO: 25.2 % (ref 34.8–46.1)
HCV AB SER QL: NORMAL
HDLC SERPL-MCNC: 74 MG/DL
HGB BLD-MCNC: 7.2 G/DL (ref 11.5–15.4)
IMM GRANULOCYTES # BLD AUTO: 0.02 THOUSAND/UL (ref 0–0.2)
IMM GRANULOCYTES NFR BLD AUTO: 0 % (ref 0–2)
LDLC SERPL CALC-MCNC: 71 MG/DL (ref 0–100)
LYMPHOCYTES # BLD AUTO: 1.23 THOUSANDS/ΜL (ref 0.6–4.47)
LYMPHOCYTES NFR BLD AUTO: 17 % (ref 14–44)
MCH RBC QN AUTO: 24.3 PG (ref 26.8–34.3)
MCHC RBC AUTO-ENTMCNC: 28.6 G/DL (ref 31.4–37.4)
MCV RBC AUTO: 85 FL (ref 82–98)
MICROALBUMIN UR-MCNC: 35.4 MG/L (ref 0–20)
MICROALBUMIN/CREAT 24H UR: 22 MG/G CREATININE (ref 0–30)
MONOCYTES # BLD AUTO: 1.05 THOUSAND/ΜL (ref 0.17–1.22)
MONOCYTES NFR BLD AUTO: 15 % (ref 4–12)
NEUTROPHILS # BLD AUTO: 4.26 THOUSANDS/ΜL (ref 1.85–7.62)
NEUTS SEG NFR BLD AUTO: 60 % (ref 43–75)
NONHDLC SERPL-MCNC: 88 MG/DL
NRBC BLD AUTO-RTO: 0 /100 WBCS
PLATELET # BLD AUTO: 434 THOUSANDS/UL (ref 149–390)
PMV BLD AUTO: 11 FL (ref 8.9–12.7)
POTASSIUM SERPL-SCNC: 4.7 MMOL/L (ref 3.5–5.3)
PROT SERPL-MCNC: 7.1 G/DL (ref 6.4–8.2)
RBC # BLD AUTO: 2.96 MILLION/UL (ref 3.81–5.12)
SODIUM SERPL-SCNC: 137 MMOL/L (ref 136–145)
TRIGL SERPL-MCNC: 85 MG/DL
TSH SERPL DL<=0.05 MIU/L-ACNC: 1.17 UIU/ML (ref 0.36–3.74)
WBC # BLD AUTO: 7.13 THOUSAND/UL (ref 4.31–10.16)

## 2021-10-14 PROCEDURE — 82043 UR ALBUMIN QUANTITATIVE: CPT

## 2021-10-14 PROCEDURE — 82570 ASSAY OF URINE CREATININE: CPT

## 2021-10-14 PROCEDURE — 85025 COMPLETE CBC W/AUTO DIFF WBC: CPT

## 2021-10-14 PROCEDURE — 86803 HEPATITIS C AB TEST: CPT

## 2021-10-14 PROCEDURE — 36415 COLL VENOUS BLD VENIPUNCTURE: CPT

## 2021-10-14 PROCEDURE — 82306 VITAMIN D 25 HYDROXY: CPT

## 2021-10-14 PROCEDURE — 84443 ASSAY THYROID STIM HORMONE: CPT

## 2021-10-14 PROCEDURE — 80061 LIPID PANEL: CPT

## 2021-10-14 PROCEDURE — 80053 COMPREHEN METABOLIC PANEL: CPT

## 2021-10-15 DIAGNOSIS — D64.9 ANEMIA, UNSPECIFIED TYPE: ICD-10-CM

## 2021-10-15 DIAGNOSIS — E55.9 VITAMIN D DEFICIENCY: ICD-10-CM

## 2021-10-15 DIAGNOSIS — Z12.11 SCREENING FOR COLORECTAL CANCER: Primary | ICD-10-CM

## 2021-10-15 DIAGNOSIS — Z12.12 SCREENING FOR COLORECTAL CANCER: Primary | ICD-10-CM

## 2021-10-15 RX ORDER — GLUCOSAMINE/CHONDR SU A SOD 750-600 MG
1 TABLET ORAL DAILY
Qty: 90 CAPSULE | Refills: 2 | Status: SHIPPED | OUTPATIENT
Start: 2021-10-15

## 2021-10-15 RX ORDER — ASCORBATE CALCIUM 500 MG
500 TABLET ORAL DAILY
Qty: 90 TABLET | Refills: 0 | Status: SHIPPED | OUTPATIENT
Start: 2021-10-15

## 2021-10-15 RX ORDER — FERROUS SULFATE TAB EC 324 MG (65 MG FE EQUIVALENT) 324 (65 FE) MG
324 TABLET DELAYED RESPONSE ORAL
Qty: 180 TABLET | Refills: 0 | Status: SHIPPED | OUTPATIENT
Start: 2021-10-15

## 2021-10-16 ENCOUNTER — NURSE TRIAGE (OUTPATIENT)
Dept: FAMILY MEDICINE CLINIC | Facility: CLINIC | Age: 71
End: 2021-10-16

## 2021-10-18 ENCOUNTER — TELEPHONE (OUTPATIENT)
Dept: SURGICAL ONCOLOGY | Facility: CLINIC | Age: 71
End: 2021-10-18

## 2021-10-18 ENCOUNTER — TELEPHONE (OUTPATIENT)
Dept: HEMATOLOGY ONCOLOGY | Facility: CLINIC | Age: 71
End: 2021-10-18

## 2021-10-21 ENCOUNTER — DOCUMENTATION (OUTPATIENT)
Dept: HEMATOLOGY ONCOLOGY | Facility: MEDICAL CENTER | Age: 71
End: 2021-10-21

## 2021-11-10 ENCOUNTER — TELEPHONE (OUTPATIENT)
Dept: HEMATOLOGY ONCOLOGY | Facility: CLINIC | Age: 71
End: 2021-11-10

## 2021-12-06 ENCOUNTER — TELEPHONE (OUTPATIENT)
Dept: HEMATOLOGY ONCOLOGY | Facility: CLINIC | Age: 71
End: 2021-12-06

## 2022-01-09 DIAGNOSIS — D64.9 ANEMIA, UNSPECIFIED TYPE: Primary | ICD-10-CM

## 2022-01-21 ENCOUNTER — TELEPHONE (OUTPATIENT)
Dept: HEMATOLOGY ONCOLOGY | Facility: CLINIC | Age: 72
End: 2022-01-21

## 2022-01-21 ENCOUNTER — TELEPHONE (OUTPATIENT)
Dept: GYNECOLOGIC ONCOLOGY | Facility: CLINIC | Age: 72
End: 2022-01-21

## 2022-01-21 NOTE — TELEPHONE ENCOUNTER
I called the patient to reschedule her appointment that was missed 12/6/21  Patient states she is not able to make an appointment at this time due to no transportation  Informed patient she can give the office a call back when she is available to reschedule at [phone]  Patient understood

## 2022-01-21 NOTE — TELEPHONE ENCOUNTER
Patient missed her consult appt ata/ Cora Cansecos on 12/6/21  Called patient to reschedule appt and patient picked up the phone and hung up right away before I could speak with her or leave a message

## 2022-02-24 ENCOUNTER — TELEPHONE (OUTPATIENT)
Dept: HEMATOLOGY ONCOLOGY | Facility: CLINIC | Age: 72
End: 2022-02-24

## 2022-02-24 NOTE — TELEPHONE ENCOUNTER
I called the patient and left a message to see if she still is interested in scheduling the consult with Sheyla Guadalupe  I then stated to call the office to let us know

## 2022-04-11 DIAGNOSIS — J31.0 RHINITIS, NON-ALLERGIC: ICD-10-CM

## 2022-04-11 RX ORDER — FLUTICASONE PROPIONATE 50 MCG
SPRAY, SUSPENSION (ML) NASAL
Qty: 48 G | Refills: 1 | Status: SHIPPED | OUTPATIENT
Start: 2022-04-11

## 2022-10-03 DIAGNOSIS — J31.0 RHINITIS, NON-ALLERGIC: ICD-10-CM

## 2022-10-04 RX ORDER — FLUTICASONE PROPIONATE 50 MCG
SPRAY, SUSPENSION (ML) NASAL
Qty: 48 G | Refills: 1 | OUTPATIENT
Start: 2022-10-04

## 2022-10-10 NOTE — TELEPHONE ENCOUNTER
Patient called and stated she has an appointment on 11/14 but she can't go without nasal spray for that long  Is asking if it can be sent for now

## 2022-11-23 DIAGNOSIS — I10 ESSENTIAL HYPERTENSION: ICD-10-CM

## 2022-11-30 RX ORDER — LISINOPRIL 20 MG/1
TABLET ORAL
Qty: 90 TABLET | Refills: 2 | Status: SHIPPED | OUTPATIENT
Start: 2022-11-30

## 2023-01-06 ENCOUNTER — TELEPHONE (OUTPATIENT)
Dept: FAMILY MEDICINE CLINIC | Facility: CLINIC | Age: 73
End: 2023-01-06

## 2023-01-06 NOTE — TELEPHONE ENCOUNTER
Left voicemail for patient to call back and reschedule appointment for 1/9  Provider will not be available

## 2023-01-18 ENCOUNTER — OFFICE VISIT (OUTPATIENT)
Dept: FAMILY MEDICINE CLINIC | Facility: CLINIC | Age: 73
End: 2023-01-18

## 2023-01-18 VITALS
HEART RATE: 119 BPM | SYSTOLIC BLOOD PRESSURE: 168 MMHG | RESPIRATION RATE: 16 BRPM | BODY MASS INDEX: 19.44 KG/M2 | DIASTOLIC BLOOD PRESSURE: 80 MMHG | WEIGHT: 106.3 LBS | TEMPERATURE: 97.4 F | OXYGEN SATURATION: 99 %

## 2023-01-18 DIAGNOSIS — Z12.31 ENCOUNTER FOR SCREENING MAMMOGRAM FOR MALIGNANT NEOPLASM OF BREAST: ICD-10-CM

## 2023-01-18 DIAGNOSIS — I10 BENIGN ESSENTIAL HYPERTENSION: Primary | ICD-10-CM

## 2023-01-18 DIAGNOSIS — Z78.0 ENCOUNTER FOR OSTEOPOROSIS SCREENING IN ASYMPTOMATIC POSTMENOPAUSAL PATIENT: ICD-10-CM

## 2023-01-18 DIAGNOSIS — Z74.8 ASSISTANCE NEEDED WITH TRANSPORTATION: ICD-10-CM

## 2023-01-18 DIAGNOSIS — N18.31 STAGE 3A CHRONIC KIDNEY DISEASE (HCC): ICD-10-CM

## 2023-01-18 DIAGNOSIS — E55.9 VITAMIN D DEFICIENCY: ICD-10-CM

## 2023-01-18 DIAGNOSIS — Z13.820 ENCOUNTER FOR OSTEOPOROSIS SCREENING IN ASYMPTOMATIC POSTMENOPAUSAL PATIENT: ICD-10-CM

## 2023-01-18 DIAGNOSIS — Z28.21 IMMUNIZATION REFUSED: ICD-10-CM

## 2023-01-18 NOTE — PROGRESS NOTES
Name: Shira Hylton      : 1950      MRN: [id number]  Encounter Provider: Rebecca Kussmaul, MD  Encounter Date: 2023   Encounter department: [address] [place] [address]     1  Benign essential hypertension  Assessment & Plan:  Check BP at home and keep record  Reviewed low salt diet and importance of daily physical activity  Walk the hallways of your building several times twice a day  Go for a walk around the block  Dance at home  Orders:  -     CBC and differential; Future  -     Comprehensive metabolic panel; Future  -     Lipid panel; Future  -     Vitamin D 25 hydroxy; Future  -     Microalbumin / creatinine urine ratio; Future  -     TSH, 3rd generation with Free T4 reflex; Future    2  Vitamin D deficiency  -     Vitamin D 25 hydroxy; Future    3  Encounter for screening mammogram for malignant neoplasm of breast  -     Mammo screening bilateral w 3d & cad; Future; Expected date: 2023    4  Encounter for osteoporosis screening in asymptomatic postmenopausal patient  -     DXA bone density spine hip and pelvis; Future; Expected date: 2023    5  Stage 3a chronic kidney disease [facility])  Assessment & Plan:  Lab Results   Component Value Date    EGFR 51 10/14/2021    EGFR 54 2019    EGFR 52 4 2016    CREATININE 1 08 10/14/2021    CREATININE 1 06 2019    CREATININE 1 05 2016   Check kidney function  Avoid nephrotoxins       6  Assistance needed with transportation  -     Ambulatory Referral to [facility]; Future    7  Immunization refused         Despite counseling patient refused age and season appropriate vaccines   Subjective      66 yo female with known HTN here today for follow up  Patient states she has been under increased stress over the last 15 to 18 months as her sister, who is the one that takes her to appointments and grocery shopping, has been dealing with her husbands declining health  Patient states she does not drive and has no other support system  She is also very upset because she has new neighbors, who she states are very loud it late hours of the night  She checks her BP at home  Home BP is usually under 130/90, does not remember specific numbers  Denies CP, SOB, LUNDBERG, LE edema  Continues to smoke, states cut back to half a pack per day      Review of Systems   HENT: Positive for congestion  All other systems reviewed and are negative  Current Outpatient Medications on File Prior to Visit   Medication Sig   • Azelastine HCl 137 MCG/SPRAY SOLN 1 spray by Each Nare route 2 (two) times a day   • Calcium Ascorbate (VITAMIN C) 500 mg tablet Take 1 tablet (500 mg total) by mouth daily   • Cholecalciferol (RA Vitamin D-3) 50 MCG (2000 UT) CAPS Take 1 capsule (2,000 Units total) by mouth daily   • ferrous sulfate 324 (65 Fe) mg Take 1 tablet (324 mg total) by mouth 2 (two) times a day before meals   • fluticasone (FLONASE) 50 mcg/act nasal spray instill 2 sprays into each nostril once daily   • lisinopril (ZESTRIL) 20 mg tablet take 1 tablet by mouth once daily   • RA Vitamin C 500 MG tablet take 1 tablet by mouth once daily       Objective     /80 (BP Location: Right arm, Patient Position: Sitting, Cuff Size: Standard)   Pulse (!) 119   Temp (!) 97 4 °F (36 3 °C) (Temporal)   Resp 16   Wt 48 2 kg (106 lb 4 8 oz)   SpO2 99%   BMI 19 44 kg/m²     Physical Exam  Vitals and nursing note reviewed  Constitutional:       Appearance: She is well-developed  HENT:      Head: Normocephalic  Right Ear: External ear normal       Left Ear: External ear normal       Nose: Nose normal    Eyes:      Conjunctiva/sclera: Conjunctivae normal       Pupils: Pupils are equal, round, and reactive to light  Neck:      Thyroid: No thyromegaly  Cardiovascular:      Rate and Rhythm: Normal rate and regular rhythm  Heart sounds: Normal heart sounds     Pulmonary:      Effort: Pulmonary effort is normal       Breath sounds: Normal breath sounds  Abdominal:      Palpations: Abdomen is soft  Tenderness: There is no abdominal tenderness  There is no guarding or rebound  Musculoskeletal:         General: Normal range of motion  Cervical back: Normal range of motion and neck supple  Skin:     General: Skin is dry  Neurological:      Mental Status: She is alert and oriented to person, place, and time  Deep Tendon Reflexes: Reflexes are normal and symmetric         Cisco Lobato MD

## 2023-01-18 NOTE — ASSESSMENT & PLAN NOTE
Check BP at home and keep record  Reviewed low salt diet and importance of daily physical activity  Walk the hallways of your building several times twice a day  Go for a walk around the block  Dance at home

## 2023-01-18 NOTE — ASSESSMENT & PLAN NOTE
Lab Results   Component Value Date    EGFR 51 10/14/2021    EGFR 54 04/25/2019    EGFR 52 4 01/13/2016    CREATININE 1 08 10/14/2021    CREATININE 1 06 04/25/2019    CREATININE 1 05 01/13/2016   Check kidney function  Avoid nephrotoxins

## 2023-01-20 DIAGNOSIS — J31.0 RHINITIS, NON-ALLERGIC: ICD-10-CM

## 2023-01-20 RX ORDER — FLUTICASONE PROPIONATE 50 MCG
SPRAY, SUSPENSION (ML) NASAL
Qty: 48 G | Refills: 1 | Status: SHIPPED | OUTPATIENT
Start: 2023-01-20

## 2023-01-20 NOTE — TELEPHONE ENCOUNTER
Patient called and she is asking when will her medication flonase be sent to pharmacy  She said she can't wait too long

## 2023-02-10 ENCOUNTER — PATIENT OUTREACH (OUTPATIENT)
Dept: FAMILY MEDICINE CLINIC | Facility: CLINIC | Age: 73
End: 2023-02-10

## 2023-02-10 NOTE — PROGRESS NOTES
SAKINA MACHUCA, [facility], placed call to Pt this day with oversight of Pia PRESLEY CM, d/t continued orientation  SAKINA MACHUCA reached out d/t consult received from Dr Sherrie Cervantes r/t Pt's transportation assistance needs  Per chart review, call placed to [facility] ([phone]) this day to determine if Pt qualifies for any transportation assistance  SAKINA MACHUCA informed that Pt does have transportation benefit  Same includes 50 round trips per year, 24 of which can be r/t non-medical needs  Message left for Pt this day r/t Pt's request for transportation information  Awaiting return call at this time to review insurance benefits r/t same  SAKINA MACHUCA will continue to follow and assist PRN

## 2023-03-22 ENCOUNTER — PATIENT OUTREACH (OUTPATIENT)
Dept: FAMILY MEDICINE CLINIC | Facility: CLINIC | Age: 73
End: 2023-03-22

## 2023-03-22 NOTE — PROGRESS NOTES
SAKINA MACHUCA placed second call to pt to follow-up regarding pt need assistance with transportation  Per chart review, SW CM Michae Meckel , called [employer] ([phone]) to determine if Pt qualifies for any transportation assistance "SAKINA MACHUCA  Was informed that Pt does have transportation benefit  Same includes 50 round trips per year, 24 of which can be r/t non-medical needs"  No answer, left a details vm and ask for a return call  Sent letter with the above information and how to schedule the ride  Will closed this referral due to lack of response  Will remains available for future consult as needed

## 2023-03-22 NOTE — LETTER
March 22, 2023     [facility]     Patient: Chencho Whipple   YOB: 1950   Date of Visit: 3/22/2023       Dear Dr Castellanos Ill:    My name is Laila Campbell, Outpatient Care manager from [facility]  I tried to contact you regarding a referral placed by your PCP to assist you with transportation, but I was unable to get a hold of you  I just want to make you aware that you have transportation services through your insurance [facility] [employer]  Your transportation benefits includes 50 rounds trips per year, 24 of which can be use for non-medical needs  To schedule the ride you can call [facility] [phone], three-day before you appointment  If you have any questions please contact me directly at [phone]         Sincerely,    Essie Devries            CC: No Recipients

## 2023-08-14 DIAGNOSIS — E55.9 VITAMIN D DEFICIENCY: ICD-10-CM

## 2023-08-14 DIAGNOSIS — I10 ESSENTIAL HYPERTENSION: ICD-10-CM

## 2023-08-14 DIAGNOSIS — J31.0 RHINITIS, NON-ALLERGIC: ICD-10-CM

## 2023-08-17 RX ORDER — GLUCOSAMINE/CHONDR SU A SOD 750-600 MG
1 TABLET ORAL DAILY
Qty: 90 CAPSULE | Refills: 2 | Status: SHIPPED | OUTPATIENT
Start: 2023-08-17

## 2023-08-17 RX ORDER — LISINOPRIL 20 MG/1
TABLET ORAL
Qty: 90 TABLET | Refills: 2 | Status: SHIPPED | OUTPATIENT
Start: 2023-08-17

## 2023-08-17 RX ORDER — FLUTICASONE PROPIONATE 50 MCG
SPRAY, SUSPENSION (ML) NASAL
Qty: 48 G | Refills: 1 | Status: SHIPPED | OUTPATIENT
Start: 2023-08-17

## 2024-04-03 ENCOUNTER — RA CDI HCC (OUTPATIENT)
Dept: OTHER | Facility: HOSPITAL | Age: 74
End: 2024-04-03

## 2024-05-15 ENCOUNTER — TELEPHONE (OUTPATIENT)
Dept: FAMILY MEDICINE CLINIC | Facility: CLINIC | Age: 74
End: 2024-05-15

## 2024-05-15 DIAGNOSIS — J31.0 RHINITIS, NON-ALLERGIC: ICD-10-CM

## 2024-05-15 DIAGNOSIS — I10 ESSENTIAL HYPERTENSION: ICD-10-CM

## 2024-05-15 NOTE — TELEPHONE ENCOUNTER
Patient call for following medication refill    lisinopril (ZESTRIL) 20 mg tablet       fluticasone (FLONASE) 50 mcg/act nasal spray [

## 2024-05-21 RX ORDER — FLUTICASONE PROPIONATE 50 MCG
2 SPRAY, SUSPENSION (ML) NASAL DAILY
Qty: 48 G | Refills: 1 | Status: SHIPPED | OUTPATIENT
Start: 2024-05-21

## 2024-05-21 RX ORDER — LISINOPRIL 20 MG/1
20 TABLET ORAL DAILY
Qty: 90 TABLET | Refills: 0 | Status: SHIPPED | OUTPATIENT
Start: 2024-05-21

## 2024-06-07 ENCOUNTER — TELEPHONE (OUTPATIENT)
Dept: FAMILY MEDICINE CLINIC | Facility: CLINIC | Age: 74
End: 2024-06-07

## 2024-08-20 DIAGNOSIS — I10 ESSENTIAL HYPERTENSION: ICD-10-CM

## 2024-08-20 RX ORDER — LISINOPRIL 20 MG/1
20 TABLET ORAL DAILY
Qty: 90 TABLET | Refills: 0 | Status: SHIPPED | OUTPATIENT
Start: 2024-08-20

## 2024-08-20 NOTE — TELEPHONE ENCOUNTER
Patient call requesting this medication refill:      lisinopril (ZESTRIL) 20 mg tablet      She has an apt for AWV

## 2024-11-26 ENCOUNTER — OFFICE VISIT (OUTPATIENT)
Dept: FAMILY MEDICINE CLINIC | Facility: CLINIC | Age: 74
End: 2024-11-26

## 2024-11-26 VITALS
BODY MASS INDEX: 20.48 KG/M2 | TEMPERATURE: 99.3 F | HEART RATE: 98 BPM | OXYGEN SATURATION: 99 % | DIASTOLIC BLOOD PRESSURE: 88 MMHG | SYSTOLIC BLOOD PRESSURE: 138 MMHG | WEIGHT: 115.6 LBS | HEIGHT: 63 IN | RESPIRATION RATE: 18 BRPM

## 2024-11-26 DIAGNOSIS — E55.9 VITAMIN D DEFICIENCY: ICD-10-CM

## 2024-11-26 DIAGNOSIS — J31.0 RHINITIS, NON-ALLERGIC: ICD-10-CM

## 2024-11-26 DIAGNOSIS — I10 ESSENTIAL HYPERTENSION: Primary | ICD-10-CM

## 2024-11-26 DIAGNOSIS — Z13.228 SCREENING FOR METABOLIC DISORDER: ICD-10-CM

## 2024-11-26 DIAGNOSIS — Z13.29 SCREENING FOR THYROID DISORDER: ICD-10-CM

## 2024-11-26 DIAGNOSIS — N18.31 STAGE 3A CHRONIC KIDNEY DISEASE (HCC): ICD-10-CM

## 2024-11-26 DIAGNOSIS — Z13.220 SCREENING FOR LIPID DISORDERS: ICD-10-CM

## 2024-11-26 DIAGNOSIS — Z13.0 SCREENING FOR DEFICIENCY ANEMIA: ICD-10-CM

## 2024-11-26 DIAGNOSIS — Z13.1 SCREENING FOR DIABETES MELLITUS: ICD-10-CM

## 2024-11-26 DIAGNOSIS — Z12.31 ENCOUNTER FOR SCREENING MAMMOGRAM FOR BREAST CANCER: ICD-10-CM

## 2024-11-26 PROCEDURE — G2211 COMPLEX E/M VISIT ADD ON: HCPCS

## 2024-11-26 PROCEDURE — 99214 OFFICE O/P EST MOD 30 MIN: CPT

## 2024-11-26 RX ORDER — LISINOPRIL 20 MG/1
20 TABLET ORAL DAILY
Qty: 90 TABLET | Refills: 1 | Status: SHIPPED | OUTPATIENT
Start: 2024-11-26 | End: 2024-11-26

## 2024-11-26 RX ORDER — FLUTICASONE PROPIONATE 50 MCG
2 SPRAY, SUSPENSION (ML) NASAL DAILY
Qty: 48 G | Refills: 3 | Status: SHIPPED | OUTPATIENT
Start: 2024-11-26

## 2024-11-26 RX ORDER — LISINOPRIL 20 MG/1
20 TABLET ORAL DAILY
Qty: 90 TABLET | Refills: 2 | Status: SHIPPED | OUTPATIENT
Start: 2024-11-26

## 2024-11-26 RX ORDER — FLUTICASONE PROPIONATE 50 MCG
2 SPRAY, SUSPENSION (ML) NASAL DAILY
Qty: 48 G | Refills: 1 | Status: SHIPPED | OUTPATIENT
Start: 2024-11-26 | End: 2024-11-26

## 2024-11-26 NOTE — ASSESSMENT & PLAN NOTE
Lab Results   Component Value Date    EGFR 51 10/14/2021    EGFR 54 04/25/2019    EGFR 52.4 01/13/2016    CREATININE 1.08 10/14/2021    CREATININE 1.06 04/25/2019    CREATININE 1.05 01/13/2016   -  to obtain labs to check kidney function  - Avoid nephrotoxins

## 2024-11-26 NOTE — ASSESSMENT & PLAN NOTE
BP Readings from Last 3 Encounters:   11/26/24 138/88   01/18/23 168/80   09/14/21 138/70      - At goal  - Continue lisinopril 20 mg daily  - Reports compliance with medications.  -  eating a low salt diet,  exercising, and weight loss.

## 2024-11-26 NOTE — PROGRESS NOTES
Name: Jennifer Ames      : 1950      MRN: [id number]  Encounter Provider: FLORIN Baker  Encounter Date: 2024   Encounter department: [facility]  :  Assessment & Plan  Essential hypertension  BP Readings from Last 3 Encounters:   24 138/88   23 168/80   21 138/70      - At goal  - Continue lisinopril 20 mg daily  - Reports compliance with medications.  -  eating a low salt diet, and exercising  Orders:    Comprehensive metabolic panel; Future    lisinopril (ZESTRIL) 20 mg tablet; Take 1 tablet (20 mg total) by mouth daily    Stage 3a chronic kidney disease (HCC)  Lab Results   Component Value Date    EGFR 51 10/14/2021    EGFR 54 2019    EGFR 52.4 2016    CREATININE 1.08 10/14/2021    CREATININE 1.06 2019    CREATININE 1.05 2016   -  to obtain labs to check kidney function  - Avoid nephrotoxins          Encounter for screening mammogram for breast cancer    Orders:    Mammo screening bilateral w 3d and cad; Future    Rhinitis, non-allergic    Orders:    fluticasone (FLONASE) 50 mcg/act nasal spray; 2 sprays into each nostril daily    Screening for lipid disorders    Orders:    Lipid panel; Future    Screening for metabolic disorder    Orders:    Comprehensive metabolic panel; Future    Screening for deficiency anemia    Orders:    CBC and differential; Future    Screening for thyroid disorder    Orders:    TSH, 3rd generation with Free T4 reflex; Future    Screening for diabetes mellitus    Orders:    Hemoglobin A1C; Future    Vitamin D deficiency    Orders:    Vitamin D 25 hydroxy; Future          Depression Screening and Follow-up Plan: Patient was screened for depression during today's encounter. They screened negative with a PHQ-2 score of 1.      History of Present Illness     Jennifer Ames is a 74 y.o. with  has no past medical history on file.     Patient presents to the clinic for management of her  "chronic medical conditions.  Patient has no further complaints other than what is mentioned in the ROS.        Review of Systems   Constitutional: Negative.  Negative for chills, fatigue and fever.   HENT: Negative.  Negative for ear pain and sore throat.    Eyes: Negative.  Negative for pain and visual disturbance.   Respiratory:  Negative for shortness of breath.    Cardiovascular: Negative.  Negative for chest pain and palpitations.   Gastrointestinal: Negative.  Negative for abdominal pain and vomiting.   Genitourinary: Negative.  Negative for dysuria and hematuria.   Musculoskeletal: Negative.  Negative for arthralgias and back pain.   Skin: Negative.  Negative for color change and rash.   Allergic/Immunologic: Positive for environmental allergies.   Neurological: Negative.  Negative for seizures and syncope.   Hematological: Negative.    Psychiatric/Behavioral: Negative.     All other systems reviewed and are negative.         Objective   /88 (BP Location: Right arm, Patient Position: Sitting, Cuff Size: Standard)   Pulse 98   Temp 99.3 °F (37.4 °C) (Temporal)   Resp 18   Ht 5' 2.5\" (1.588 m)   Wt 52.4 kg (115 lb 9.6 oz)   SpO2 99%   BMI 20.81 kg/m²      Physical Exam  Vitals and nursing note reviewed.   Constitutional:       General: She is not in acute distress.     Appearance: Normal appearance. She is well-developed.   HENT:      Head: Normocephalic and atraumatic.      Right Ear: Tympanic membrane normal.      Left Ear: Tympanic membrane normal.      Nose: Nose normal.      Mouth/Throat:      Mouth: Mucous membranes are moist.   Eyes:      Conjunctiva/sclera: Conjunctivae normal.   Cardiovascular:      Rate and Rhythm: Normal rate and regular rhythm.      Pulses: Normal pulses.      Heart sounds: Normal heart sounds. No murmur heard.  Pulmonary:      Effort: Pulmonary effort is normal. No respiratory distress.      Breath sounds: Normal breath sounds.   Abdominal:      General: Abdomen is " flat. Bowel sounds are normal.      Palpations: Abdomen is soft.      Tenderness: There is no abdominal tenderness.   Musculoskeletal:         General: No swelling. Normal range of motion.      Cervical back: Normal range of motion and neck supple.   Skin:     General: Skin is warm and dry.      Capillary Refill: Capillary refill takes less than 2 seconds.   Neurological:      General: No focal deficit present.      Mental Status: She is alert and oriented to person, place, and time. Mental status is at baseline.   Psychiatric:         Mood and Affect: Mood normal.         Behavior: Behavior normal.         Thought Content: Thought content normal.         Judgment: Judgment normal.

## 2025-08-04 DIAGNOSIS — I10 ESSENTIAL HYPERTENSION: ICD-10-CM

## 2025-08-04 RX ORDER — LISINOPRIL 20 MG/1
20 TABLET ORAL DAILY
Qty: 90 TABLET | Refills: 2 | Status: SHIPPED | OUTPATIENT
Start: 2025-08-04